# Patient Record
(demographics unavailable — no encounter records)

---

## 2017-12-18 NOTE — RAD
PORTABLE CHEST:

 

Date: 12-18-17 

 

Provided Clinical History: 

Dyspnea. 

 

FINDINGS: 

No comparison. Cardiomediastinal silhouette is within normal limits. No focal consolidation, pleural 
fluid, or pneumothorax apparent. 

 

IMPRESSION: 

No evidence for an acute cardiopulmonary process. 

 

POS: [id number]